# Patient Record
Sex: MALE | Race: WHITE | Employment: OTHER | URBAN - METROPOLITAN AREA
[De-identification: names, ages, dates, MRNs, and addresses within clinical notes are randomized per-mention and may not be internally consistent; named-entity substitution may affect disease eponyms.]

---

## 2020-08-20 ENCOUNTER — HOSPITAL ENCOUNTER (OUTPATIENT)
Dept: PREADMISSION TESTING | Age: 75
Discharge: HOME | End: 2020-08-20
Payer: MEDICARE

## 2020-08-20 LAB
ANION GAP SERPL CALC-SCNC: 4 MMOL/L (ref 3–18)
BUN SERPL-MCNC: 10 MG/DL (ref 7–18)
BUN/CREAT SERPL: 11 (ref 12–20)
CALCIUM SERPL-MCNC: 9.4 MG/DL (ref 8.5–10.1)
CHLORIDE SERPL-SCNC: 106 MMOL/L (ref 100–111)
CO2 SERPL-SCNC: 31 MMOL/L (ref 21–32)
CREAT SERPL-MCNC: 0.88 MG/DL (ref 0.6–1.3)
GLUCOSE SERPL-MCNC: 85 MG/DL (ref 74–99)
HCT VFR BLD AUTO: 43.3 % (ref 36–48)
HGB BLD-MCNC: 14.4 G/DL (ref 13–16)
POTASSIUM SERPL-SCNC: 3.9 MMOL/L (ref 3.5–5.5)
SODIUM SERPL-SCNC: 141 MMOL/L (ref 136–145)

## 2020-08-20 PROCEDURE — 80048 BASIC METABOLIC PNL TOTAL CA: CPT

## 2020-08-20 PROCEDURE — 36415 COLL VENOUS BLD VENIPUNCTURE: CPT

## 2020-08-20 PROCEDURE — 85018 HEMOGLOBIN: CPT

## 2020-08-26 ENCOUNTER — HOSPITAL ENCOUNTER (OUTPATIENT)
Dept: PREADMISSION TESTING | Age: 75
Discharge: HOME | End: 2020-08-26
Payer: MEDICARE

## 2020-08-26 PROCEDURE — 87635 SARS-COV-2 COVID-19 AMP PRB: CPT

## 2020-08-27 LAB — SARS-COV-2, COV2NT: NOT DETECTED

## 2020-09-02 ENCOUNTER — HOSPITAL ENCOUNTER (OUTPATIENT)
Age: 75
Setting detail: OUTPATIENT SURGERY
Discharge: HOME | End: 2020-09-02
Attending: SURGERY | Admitting: SURGERY
Payer: MEDICARE

## 2020-09-02 ENCOUNTER — ANESTHESIA (OUTPATIENT)
Dept: SURGERY | Age: 75
End: 2020-09-02
Payer: MEDICARE

## 2020-09-02 ENCOUNTER — ANESTHESIA EVENT (OUTPATIENT)
Dept: SURGERY | Age: 75
End: 2020-09-02
Payer: MEDICARE

## 2020-09-02 VITALS
BODY MASS INDEX: 26.22 KG/M2 | HEART RATE: 75 BPM | WEIGHT: 177 LBS | HEIGHT: 69 IN | TEMPERATURE: 97.6 F | DIASTOLIC BLOOD PRESSURE: 74 MMHG | RESPIRATION RATE: 16 BRPM | OXYGEN SATURATION: 99 % | SYSTOLIC BLOOD PRESSURE: 132 MMHG

## 2020-09-02 DIAGNOSIS — K40.30 INGUINAL HERNIA WITH OBSTRUCTION WITHOUT GANGRENE, RECURRENCE NOT SPECIFIED, UNSPECIFIED LATERALITY: Primary | ICD-10-CM

## 2020-09-02 PROCEDURE — 77030022704 HC SUT VLOC COVD -B: Performed by: SURGERY

## 2020-09-02 PROCEDURE — 77030031492 HC PRT ACC BLNT AIRSEAL CNMD -B: Performed by: SURGERY

## 2020-09-02 PROCEDURE — 77030002966 HC SUT PDS J&J -A: Performed by: SURGERY

## 2020-09-02 PROCEDURE — C1781 MESH (IMPLANTABLE): HCPCS | Performed by: SURGERY

## 2020-09-02 PROCEDURE — 77030002933 HC SUT MCRYL J&J -A: Performed by: SURGERY

## 2020-09-02 PROCEDURE — 74011250636 HC RX REV CODE- 250/636: Performed by: SURGERY

## 2020-09-02 PROCEDURE — 77030016151 HC PROTCTR LNS DFOG COVD -B: Performed by: SURGERY

## 2020-09-02 PROCEDURE — 77030040361 HC SLV COMPR DVT MDII -B: Performed by: SURGERY

## 2020-09-02 PROCEDURE — 76060000033 HC ANESTHESIA 1 TO 1.5 HR: Performed by: SURGERY

## 2020-09-02 PROCEDURE — 76010000934 HC OR TIME 1 TO 1.5HR INTENSV - TIER 2: Performed by: SURGERY

## 2020-09-02 PROCEDURE — 77030010507 HC ADH SKN DERMBND J&J -B: Performed by: SURGERY

## 2020-09-02 PROCEDURE — 74011250636 HC RX REV CODE- 250/636: Performed by: ANESTHESIOLOGY

## 2020-09-02 PROCEDURE — 77030033200 HC PRT CLSR CRTR THOMP COOP -C: Performed by: SURGERY

## 2020-09-02 PROCEDURE — 77030008606 HC TRCR ENDOSC KII AMR -B: Performed by: SURGERY

## 2020-09-02 PROCEDURE — 77030008683 HC TU ET CUF COVD -A: Performed by: ANESTHESIOLOGY

## 2020-09-02 PROCEDURE — 76210000017 HC OR PH I REC 1.5 TO 2 HR: Performed by: SURGERY

## 2020-09-02 PROCEDURE — 77030006643: Performed by: ANESTHESIOLOGY

## 2020-09-02 PROCEDURE — 74011000250 HC RX REV CODE- 250: Performed by: NURSE ANESTHETIST, CERTIFIED REGISTERED

## 2020-09-02 PROCEDURE — 74011250637 HC RX REV CODE- 250/637: Performed by: ANESTHESIOLOGY

## 2020-09-02 PROCEDURE — 77030003578 HC NDL INSUF VERES AMR -B: Performed by: SURGERY

## 2020-09-02 PROCEDURE — 74011000258 HC RX REV CODE- 258: Performed by: SURGERY

## 2020-09-02 PROCEDURE — 77030020782 HC GWN BAIR PAWS FLX 3M -B: Performed by: SURGERY

## 2020-09-02 PROCEDURE — 76210000021 HC REC RM PH II 0.5 TO 1 HR: Performed by: SURGERY

## 2020-09-02 PROCEDURE — 77030012770 HC TRCR OPT FX AMR -B: Performed by: SURGERY

## 2020-09-02 PROCEDURE — 74011000250 HC RX REV CODE- 250: Performed by: SURGERY

## 2020-09-02 PROCEDURE — 74011000250 HC RX REV CODE- 250: Performed by: ANESTHESIOLOGY

## 2020-09-02 PROCEDURE — 77030035277 HC OBTRTR BLDELSS DISP INTU -B: Performed by: SURGERY

## 2020-09-02 DEVICE — LAPAROSCOPIC SELF-FIXATING MESH POLYESTER WITH POLYLACTIC ACID GRIPS AND COLLAGEN FILM
Type: IMPLANTABLE DEVICE | Site: GROIN | Status: FUNCTIONAL
Brand: PROGRIP

## 2020-09-02 RX ORDER — INSULIN LISPRO 100 [IU]/ML
INJECTION, SOLUTION INTRAVENOUS; SUBCUTANEOUS ONCE
Status: DISCONTINUED | OUTPATIENT
Start: 2020-09-02 | End: 2020-09-02 | Stop reason: HOSPADM

## 2020-09-02 RX ORDER — FLUMAZENIL 0.1 MG/ML
0.2 INJECTION INTRAVENOUS
Status: DISCONTINUED | OUTPATIENT
Start: 2020-09-02 | End: 2020-09-02 | Stop reason: HOSPADM

## 2020-09-02 RX ORDER — SODIUM CHLORIDE, SODIUM LACTATE, POTASSIUM CHLORIDE, CALCIUM CHLORIDE 600; 310; 30; 20 MG/100ML; MG/100ML; MG/100ML; MG/100ML
1000 INJECTION, SOLUTION INTRAVENOUS CONTINUOUS
Status: DISCONTINUED | OUTPATIENT
Start: 2020-09-02 | End: 2020-09-02 | Stop reason: HOSPADM

## 2020-09-02 RX ORDER — ONDANSETRON 2 MG/ML
INJECTION INTRAMUSCULAR; INTRAVENOUS AS NEEDED
Status: DISCONTINUED | OUTPATIENT
Start: 2020-09-02 | End: 2020-09-02 | Stop reason: HOSPADM

## 2020-09-02 RX ORDER — MIDAZOLAM HYDROCHLORIDE 1 MG/ML
INJECTION, SOLUTION INTRAMUSCULAR; INTRAVENOUS AS NEEDED
Status: DISCONTINUED | OUTPATIENT
Start: 2020-09-02 | End: 2020-09-02 | Stop reason: HOSPADM

## 2020-09-02 RX ORDER — DEXTROSE MONOHYDRATE 100 MG/ML
125-250 INJECTION, SOLUTION INTRAVENOUS AS NEEDED
Status: DISCONTINUED | OUTPATIENT
Start: 2020-09-02 | End: 2020-09-02 | Stop reason: HOSPADM

## 2020-09-02 RX ORDER — FENTANYL CITRATE 50 UG/ML
INJECTION, SOLUTION INTRAMUSCULAR; INTRAVENOUS AS NEEDED
Status: DISCONTINUED | OUTPATIENT
Start: 2020-09-02 | End: 2020-09-02 | Stop reason: HOSPADM

## 2020-09-02 RX ORDER — MAGNESIUM SULFATE 100 %
4 CRYSTALS MISCELLANEOUS AS NEEDED
Status: DISCONTINUED | OUTPATIENT
Start: 2020-09-02 | End: 2020-09-02 | Stop reason: HOSPADM

## 2020-09-02 RX ORDER — ROCURONIUM BROMIDE 10 MG/ML
INJECTION, SOLUTION INTRAVENOUS AS NEEDED
Status: DISCONTINUED | OUTPATIENT
Start: 2020-09-02 | End: 2020-09-02 | Stop reason: HOSPADM

## 2020-09-02 RX ORDER — SODIUM CHLORIDE 0.9 % (FLUSH) 0.9 %
5-40 SYRINGE (ML) INJECTION EVERY 8 HOURS
Status: DISCONTINUED | OUTPATIENT
Start: 2020-09-02 | End: 2020-09-02 | Stop reason: HOSPADM

## 2020-09-02 RX ORDER — PROPOFOL 10 MG/ML
INJECTION, EMULSION INTRAVENOUS AS NEEDED
Status: DISCONTINUED | OUTPATIENT
Start: 2020-09-02 | End: 2020-09-02 | Stop reason: HOSPADM

## 2020-09-02 RX ORDER — FENTANYL CITRATE 50 UG/ML
25 INJECTION, SOLUTION INTRAMUSCULAR; INTRAVENOUS AS NEEDED
Status: DISCONTINUED | OUTPATIENT
Start: 2020-09-02 | End: 2020-09-02 | Stop reason: HOSPADM

## 2020-09-02 RX ORDER — DEXAMETHASONE SODIUM PHOSPHATE 4 MG/ML
INJECTION, SOLUTION INTRA-ARTICULAR; INTRALESIONAL; INTRAMUSCULAR; INTRAVENOUS; SOFT TISSUE AS NEEDED
Status: DISCONTINUED | OUTPATIENT
Start: 2020-09-02 | End: 2020-09-02 | Stop reason: HOSPADM

## 2020-09-02 RX ORDER — SODIUM CHLORIDE, SODIUM LACTATE, POTASSIUM CHLORIDE, CALCIUM CHLORIDE 600; 310; 30; 20 MG/100ML; MG/100ML; MG/100ML; MG/100ML
125 INJECTION, SOLUTION INTRAVENOUS CONTINUOUS
Status: DISCONTINUED | OUTPATIENT
Start: 2020-09-02 | End: 2020-09-02 | Stop reason: HOSPADM

## 2020-09-02 RX ORDER — EPHEDRINE SULFATE/0.9% NACL/PF 50 MG/5 ML
SYRINGE (ML) INTRAVENOUS AS NEEDED
Status: DISCONTINUED | OUTPATIENT
Start: 2020-09-02 | End: 2020-09-02 | Stop reason: HOSPADM

## 2020-09-02 RX ORDER — OXYCODONE AND ACETAMINOPHEN 5; 325 MG/1; MG/1
1 TABLET ORAL
Qty: 20 TAB | Refills: 0 | Status: SHIPPED | OUTPATIENT
Start: 2020-09-02 | End: 2020-09-07

## 2020-09-02 RX ORDER — HYDROMORPHONE HYDROCHLORIDE 1 MG/ML
0.5 INJECTION, SOLUTION INTRAMUSCULAR; INTRAVENOUS; SUBCUTANEOUS
Status: COMPLETED | OUTPATIENT
Start: 2020-09-02 | End: 2020-09-02

## 2020-09-02 RX ORDER — LIDOCAINE HYDROCHLORIDE 20 MG/ML
INJECTION, SOLUTION EPIDURAL; INFILTRATION; INTRACAUDAL; PERINEURAL AS NEEDED
Status: DISCONTINUED | OUTPATIENT
Start: 2020-09-02 | End: 2020-09-02 | Stop reason: HOSPADM

## 2020-09-02 RX ORDER — OXYCODONE HYDROCHLORIDE 5 MG/1
5 TABLET ORAL ONCE
Status: COMPLETED | OUTPATIENT
Start: 2020-09-02 | End: 2020-09-02

## 2020-09-02 RX ORDER — NALOXONE HYDROCHLORIDE 0.4 MG/ML
0.1 INJECTION, SOLUTION INTRAMUSCULAR; INTRAVENOUS; SUBCUTANEOUS AS NEEDED
Status: DISCONTINUED | OUTPATIENT
Start: 2020-09-02 | End: 2020-09-02 | Stop reason: HOSPADM

## 2020-09-02 RX ORDER — SODIUM CHLORIDE 0.9 % (FLUSH) 0.9 %
5-40 SYRINGE (ML) INJECTION AS NEEDED
Status: DISCONTINUED | OUTPATIENT
Start: 2020-09-02 | End: 2020-09-02 | Stop reason: HOSPADM

## 2020-09-02 RX ORDER — BUPIVACAINE HYDROCHLORIDE 2.5 MG/ML
INJECTION, SOLUTION EPIDURAL; INFILTRATION; INTRACAUDAL AS NEEDED
Status: DISCONTINUED | OUTPATIENT
Start: 2020-09-02 | End: 2020-09-02 | Stop reason: HOSPADM

## 2020-09-02 RX ORDER — KETAMINE HYDROCHLORIDE 10 MG/ML
INJECTION, SOLUTION INTRAMUSCULAR; INTRAVENOUS AS NEEDED
Status: DISCONTINUED | OUTPATIENT
Start: 2020-09-02 | End: 2020-09-02 | Stop reason: HOSPADM

## 2020-09-02 RX ADMIN — SUGAMMADEX 200 MG: 100 INJECTION, SOLUTION INTRAVENOUS at 12:32

## 2020-09-02 RX ADMIN — FENTANYL CITRATE 25 MCG: 50 INJECTION, SOLUTION INTRAMUSCULAR; INTRAVENOUS at 13:58

## 2020-09-02 RX ADMIN — HYDROMORPHONE HYDROCHLORIDE 0.5 MG: 1 INJECTION, SOLUTION INTRAMUSCULAR; INTRAVENOUS; SUBCUTANEOUS at 13:06

## 2020-09-02 RX ADMIN — LIDOCAINE HYDROCHLORIDE 80 MG: 20 INJECTION, SOLUTION EPIDURAL; INFILTRATION; INTRACAUDAL; PERINEURAL at 11:17

## 2020-09-02 RX ADMIN — ROCURONIUM BROMIDE 35 MG: 10 INJECTION, SOLUTION INTRAVENOUS at 11:18

## 2020-09-02 RX ADMIN — MIDAZOLAM 2 MG: 1 INJECTION INTRAMUSCULAR; INTRAVENOUS at 11:08

## 2020-09-02 RX ADMIN — SODIUM CHLORIDE, SODIUM LACTATE, POTASSIUM CHLORIDE, AND CALCIUM CHLORIDE: 600; 310; 30; 20 INJECTION, SOLUTION INTRAVENOUS at 11:34

## 2020-09-02 RX ADMIN — DEXAMETHASONE SODIUM PHOSPHATE 4 MG: 4 INJECTION, SOLUTION INTRAMUSCULAR; INTRAVENOUS at 11:28

## 2020-09-02 RX ADMIN — SODIUM CHLORIDE, SODIUM LACTATE, POTASSIUM CHLORIDE, AND CALCIUM CHLORIDE 1000 ML: 600; 310; 30; 20 INJECTION, SOLUTION INTRAVENOUS at 13:16

## 2020-09-02 RX ADMIN — KETAMINE HYDROCHLORIDE 20 MG: 10 INJECTION, SOLUTION INTRAMUSCULAR; INTRAVENOUS at 11:43

## 2020-09-02 RX ADMIN — CEFOXITIN SODIUM 2 G: 2 POWDER, FOR SOLUTION INTRAVENOUS at 11:23

## 2020-09-02 RX ADMIN — HYDROMORPHONE HYDROCHLORIDE 0.5 MG: 1 INJECTION, SOLUTION INTRAMUSCULAR; INTRAVENOUS; SUBCUTANEOUS at 13:29

## 2020-09-02 RX ADMIN — FENTANYL CITRATE 100 MCG: 50 INJECTION, SOLUTION INTRAMUSCULAR; INTRAVENOUS at 11:17

## 2020-09-02 RX ADMIN — SODIUM CHLORIDE, SODIUM LACTATE, POTASSIUM CHLORIDE, AND CALCIUM CHLORIDE 125 ML/HR: 600; 310; 30; 20 INJECTION, SOLUTION INTRAVENOUS at 08:32

## 2020-09-02 RX ADMIN — Medication 10 MG: at 11:21

## 2020-09-02 RX ADMIN — PROPOFOL 200 MG: 10 INJECTION, EMULSION INTRAVENOUS at 11:17

## 2020-09-02 RX ADMIN — OXYCODONE 5 MG: 5 TABLET ORAL at 15:20

## 2020-09-02 RX ADMIN — KETAMINE HYDROCHLORIDE 10 MG: 10 INJECTION, SOLUTION INTRAMUSCULAR; INTRAVENOUS at 12:03

## 2020-09-02 RX ADMIN — ONDANSETRON HYDROCHLORIDE 4 MG: 2 INJECTION INTRAMUSCULAR; INTRAVENOUS at 11:33

## 2020-09-02 RX ADMIN — ROCURONIUM BROMIDE 5 MG: 10 INJECTION, SOLUTION INTRAVENOUS at 11:57

## 2020-09-02 RX ADMIN — HYDROMORPHONE HYDROCHLORIDE 0.5 MG: 1 INJECTION, SOLUTION INTRAMUSCULAR; INTRAVENOUS; SUBCUTANEOUS at 13:41

## 2020-09-02 RX ADMIN — ROCURONIUM BROMIDE 15 MG: 10 INJECTION, SOLUTION INTRAVENOUS at 11:41

## 2020-09-02 RX ADMIN — HYDROMORPHONE HYDROCHLORIDE 0.5 MG: 1 INJECTION, SOLUTION INTRAMUSCULAR; INTRAVENOUS; SUBCUTANEOUS at 13:19

## 2020-09-02 NOTE — INTERVAL H&P NOTE
Update History & Physical 
 
The Patient's History and Physical of September 2,  
 was reviewed with the patient and I examined the patient. There was no change. The surgical site was confirmed by the patient and me. Plan:  The risk, benefits, expected outcome, and alternative to the recommended procedure have been discussed with the patient. Patient understands and wants to proceed with the procedure.  
 
Electronically signed by Tj Harding MD on 9/2/2020 at 8:43 AM

## 2020-09-02 NOTE — BRIEF OP NOTE
Brief Postoperative Note    Patient: Jobie Gaucher  YOB: 1945  MRN: [id number]    Date of Procedure: 9/2/2020     Pre-Op Diagnosis: LEFT INGUINAL HERNIA    Post-Op Diagnosis: Same as preoperative diagnosis. Procedure(s):  ROBOTIC REPAIR LEFT INGUINAL HERNIA, NEURECTOMY INGUINAL NERVES    Surgeon(s): David Multani MD    Surgical Assistant: None    Anesthesia: General     Estimated Blood Loss (mL): Minimal    Complications: None    Specimens: * No specimens in log *     Implants:   Implant Name Type Inv.  Item Serial No.  Lot No. LRB No. Used Action   MESH ABSORB SURG PROGRIP [id number] -- PROGRIP - [id number]  [address] [place], [place] [id number] Left 1 Implanted       Drains: * No LDAs found *    Findings: H    Electronically Signed by Lorrie Lord MD on 9/2/2020 at 12:20 PM

## 2020-09-02 NOTE — PERIOP NOTES
TRANSFER - IN REPORT:    Verbal report received from [facility], BRYAN and Erendira Espinal RN on Jeffrey Mccartney  being received from OR(unit) for routine progression of care      Report consisted of patients Situation, Background, Assessment and   Recommendations(SBAR). Information from the following report(s) OR Summary, Procedure Summary, Intake/Output and MAR was reviewed with the receiving nurse. Opportunity for questions and clarification was provided. Assessment completed upon patients arrival to unit and care assumed.

## 2020-09-02 NOTE — DISCHARGE INSTRUCTIONS
Post-Operative Discharge Instructions  Araceli Burgos. Katy Andersen M.D.  [address] [place] [address], Sharri Arriaga  [phone]    Patient: Bette Caballero MRN: [id number]  [facility]: [id number]    YOB: 1945  Age: 70 y.o. Sex: male    DOA: 2020 LOS:  LOS: 0 days   Discharge Date:      Acute Diagnoses:  LEFT INGUINAL HERNIA    Chronic Medical Diagnoses:      Diet  1. Resume prior to surgery diet as tolerated. Activity  1. Do not drive a car or operate any hazardous machinery the day of surgery. 2. Rest quietly today. 3. No bending or heavy lifting. 4. You may resume other prior to surgery activities as tolerated. 5. You may remove the bandage and shower in 1 day. Drain / Wound Care  1. Follow all drain / wound care instructions exactly as explained by the Nurse at time of discharge. 2. Apply an ice pack to the surgical site for 48 hours. 3. Do not put any salves or ointments on the wound. Allow it to form a dry scab. 4. Leave steri-strips / Dermabond alone. They should be allowed to fall off on their own in 7-14 days. Medications  1. It is important to take your medications exactly as they are prescribed. 2. Keep your medication in the bottles provided by the pharmacist, and keep a list of the medication names, dosages, and times they should be taken in your wallet. Call 911 anytime you think you may need emergency care. For example, call if:  · You passed out (lost consciousness). · You have severe trouble breathing. · You have sudden chest pain and shortness of breath. Notify your Surgeon for any of the followin. Fever, chills, nausea, vomiting, severe abdominal pain or bleeding. 2. If you experience any redness or discharge or sign of infection. 3. Persistent nausea lasting more than 24 hours. If you are unable to reach your Surgeon for any of the symptoms above, you should proceed directly to the nearest Emergency Department.     Post-Operative Appointment Information    Call Dr. Halina Camp office tomorrow morning at [phone] to schedule a post-operative office visit in one (1) week. If any questions or concerns arise, call your Surgeon at [phone]. DISCHARGE SUMMARY from Nurse    PATIENT INSTRUCTIONS:    After general anesthesia or intravenous sedation, for 24 hours or while taking prescription Narcotics:  · Limit your activities  · Do not drive and operate hazardous machinery  · Do not make important personal or business decisions  · Do  not drink alcoholic beverages  · If you have not urinated within 8 hours after discharge, please contact your surgeon on call. Report the following to your surgeon:  · Excessive pain, swelling, redness or odor of or around the surgical area  · Temperature over 100.5  · Nausea and vomiting lasting longer than 4 hours or if unable to take medications  · Any signs of decreased circulation or nerve impairment to extremity: change in color, persistent  numbness, tingling, coldness or increase pain  · Any questions    What to do at Home:  Recommended activity: Activity as tolerated and no driving for today,     If you experience any of the following symptoms as above, please follow up with Dr Marie Martinez. *  Please give a list of your current medications to your Primary Care Provider. *  Please update this list whenever your medications are discontinued, doses are      changed, or new medications (including over-the-counter products) are added. *  Please carry medication information at all times in case of emergency situations. These are general instructions for a healthy lifestyle:    No smoking/ No tobacco products/ Avoid exposure to second hand smoke  Surgeon General's Warning:  Quitting smoking now greatly reduces serious risk to your health.     Obesity, smoking, and sedentary lifestyle greatly increases your risk for illness    A healthy diet, regular physical exercise & weight monitoring are important for maintaining a healthy lifestyle    You may be retaining fluid if you have a history of heart failure or if you experience any of the following symptoms:  Weight gain of 3 pounds or more overnight or 5 pounds in a week, increased swelling in our hands or feet or shortness of breath while lying flat in bed. Please call your doctor as soon as you notice any of these symptoms; do not wait until your next office visit. Patient Education        9 Things To Do If You've Been Exposed to COVID-19    Stay home. If you've been exposed, you should stay in isolation for 14 days. Don't go to school, work, or public areas. And don't use public transportation, ride-shares, or taxis unless you have no choice. Leave your home only if you need to get medical care. But call the doctor's office first so they know you're coming, and wear a cloth face cover when you go. Call your doctor. Call your doctor or other health professional to let them know that you've been exposed. They might want you to be tested, or they may have other instructions for you. If you become sick, wear a face cover when you are around other people. It can help stop the spread of the virus when you cough or sneeze. Limit contact with people in your home. If possible, stay in a separate bedroom and use a separate bathroom. Avoid contact with pets and other animals. Cover your mouth and nose with a tissue when you cough or sneeze. Then throw it in the trash right away. Wash your hands often, especially after you cough or sneeze. Use soap and water, and scrub for at least 20 seconds. If soap and water aren't available, use an alcohol-based hand . Don't share personal household items. These include bedding, towels, cups and glasses, and eating utensils. Clean and disinfect your home every day. Use household  or disinfectant wipes or sprays.  Take special care to clean things that you grab with your hands. These include doorknobs, remote controls, phones, and handles on your refrigerator and microwave. And don't forget countertops, tabletops, bathrooms, and computer keyboards. Current as of: May 8, 2020               Content Version: 12.5  © 2006-2020 [employer]. Care instructions adapted under license by [employer] (which disclaims liability or warranty for this information). If you have questions about a medical condition or this instruction, always ask your healthcare professional. [place] [address] any warranty or liability for your use of this information. Patient armband removed and shredded  The discharge information has been reviewed with the patient and spouse. The patient and spouse verbalized understanding. Discharge medications reviewed with the patient and spouse and appropriate educational materials and side effects teaching were provided.   ___________________________________________________________________________________________________________________________________

## 2020-09-02 NOTE — ANESTHESIA PREPROCEDURE EVALUATION
Relevant Problems   No relevant active problems       Anesthetic History   No history of anesthetic complications            Review of Systems / Medical History  Patient summary reviewed, nursing notes reviewed and pertinent labs reviewed    Pulmonary  Within defined limits          Asthma        Neuro/Psych   Within defined limits           Cardiovascular                Pertinent negatives: No hypertension       GI/Hepatic/Renal  Within defined limits              Endo/Other  Within defined limits           Other Findings              Physical Exam    Airway  Mallampati: II  TM Distance: 4 - 6 cm  Neck ROM: normal range of motion   Mouth opening: Normal     Cardiovascular               Dental  No notable dental hx       Pulmonary                 Abdominal  GI exam deferred       Other Findings            Anesthetic Plan    ASA: 2  Anesthesia type: general          Induction: Intravenous  Anesthetic plan and risks discussed with: Patient

## 2020-09-02 NOTE — ANESTHESIA POSTPROCEDURE EVALUATION
Procedure(s):  ROBOTIC REPAIR LEFT INGUINAL HERNIA, NEURECTOMY INGUINAL NERVES. general    Anesthesia Post Evaluation        Comments: Post-Anesthesia Evaluation & Assessment    Patient hemodynamically stable    No untreated/active PONV    Post-operative hydration adequate. Adequate post-operative analgesia per PACU discharge criteria    Mental status & level of consciousness: alert and oriented x 3    Respiratory status: patent unassisted airway     No apparent anesthetic complications requiring additional post-anesthetic care    Patient has met all discharge requirements. Nicolas Arellano MD          INITIAL Post-op Vital signs:   Vitals Value Taken Time   /88 9/2/2020  2:15 PM   Temp 36.4 °C (97.5 °F) 9/2/2020 12:46 PM   Pulse 68 9/2/2020  2:22 PM   Resp 16 9/2/2020  2:22 PM   SpO2 95 % 9/2/2020  2:22 PM   Vitals shown include unvalidated device data.

## 2020-09-02 NOTE — H&P
Assessment/Plan  # Detail Type Description    1. Assessment Inguinal hernia (K40.90). Patient Plan Discussed DX and options. Rec robotic repair. I have discussed the risks benefits and alternatives of the procedure to the patient including bleeding, infection, use of mesh, myofascial release, chronic post op pain, reason and side effects of nerve resections, recurrence and loss of testicle. They understand and wish to proceed. 2. 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 3. Assessment Cancer of prostate (C61). 4. Assessment Essential (primary) hypertension (I10). 5. Assessment Body mass index (BMI) 27.0-27.9, adult (D55.06). Plan Orders Today's instructions / counseling include(s) Dietary management education, guidance, and counseling. This 70year old male presents for Hernia. History of Present Illness:  1. Hernia   Severity: moderate. The problem is worse. It occurs occasionally. and LLQ  There is radiation to groin. The patient describes it as dull. Context includes physical activity. Identified risk factors include overweight. Symptom is aggravated by physical activity. Relieving factors include ice and sitting down. Pertinent negatives include abdominal distention, anorexia, back pain, bloating, blood in stool, constipation, cough, diaphoresis, diarrhea, dizziness, dyspnea, epigastric pain, eructation, fatigue, fever, flank pain, flatulence, heartburn, hematuria, jaundice, lightheadedness, menstruation, milk/dairy intolerance, myalgia, nausea, post prandial fullness, reflux, scrotal swelling, vomiting, weight gain and weight loss. Additional information: 70year old male who was referred by Dr. Baldomero May is being seen today for a LIH. Hernia with pain now. Hx prostate CA with obs. Jelena Oswald PROBLEM LIST:   Problem List reviewed.    Problem Description Onset Date Chronic Clinical Status Notes   Cancer of prostate 06/05/2018 Y Asthma 10/28/2010 Y     Varicose veins of lower extremity 10/28/2010 Y     Allergic rhinitis 10/28/2010 Y     Benign essential hypertension 10/28/2010 Y     Hyperlipidemia 10/28/2010 Y           PAST MEDICAL/SURGICAL HISTORY  (Detailed)    Disease/disorder Onset Date Management Date Comments     MRI-US fusion prostate biopsy (all codes - same as MM from ?) 10/16/2019      Shoulder Surgery 2006    Leg Fracture         Family History  (Detailed)  Relationship Family Member Name  Age at Death Condition Onset Age Cause of Death   Family h/o    Cancer - prostate     Siblings    Diabetes mellitus  N   Siblings    Cancer, prostate  N       Social History:  (Detailed)  Tobacco use reviewed. The patient is right-handed. Preferred language is [place]. The patient does not need an . EDUCATION/EMPLOYMENT/OCCUPATION  Employment History Status Retired Restrictions     full-time       MARITAL STATUS/FAMILY/SOCIAL SUPPORT  Marital status:    CHILDREN  Has children:  Tobacco use status: Ex-cigarette smoker. Smoking status: Former smoker. TOBACCO SCREENING:  Patient has used tobacco. Patient has not used tobacco in the last 30 days. SMOKING STATUS  Type Smoking Status Usage Per Day Years Used Pack Years Total Pack Years   Cigarette Former smoker 2 Packs 10.00 20.00 20.00     VAPING USE  Screened for vaping? Yes  Status: Not a current user    TOBACCO/VAPING EXPOSURE  There is passive smoke exposure. The patient has not had exposure to second hand smoke in the home environment. Other exposure locations include: Public. Tobacco type: Cigarette  Level of exposure: Mild  ALCOHOL  There is a history of alcohol use. Type: Beer. 3-4 beers consumed daily. Last alcoholic drink was last night. CAFFEINE  The patient uses caffeine: coffee - 2 cups a day. LIFESTYLE  Moderate activity level. Exercises daily. DIET  high calorie.         Medications (active prior to today)  Medication Name Sig Description Start Date Stop Date Refilled Rx Elsewhere   FLUTICASONE PROP 50 MCG SPRAY instill 2 sprays into each nostril once daily 12/11/2014 12/11/2014 N   albuterol sulfate 2.5 mg/3 mL (0.083 %) solution for nebulization inhale contents of 1 vial in nebulizer every 4 hours if needed for cough or wheezing 03/31/2016 03/31/2016 N   clobetasol 0.05 % topical cream apply by topical route 2 times every day a thin layer to the affected area(s) 04/06/2020   N     Patient Status   Completed with information received for patient transitioning into care. Medication Reconciliation  Medications reconciled today. Medication Reviewed  Adherence Medication Name Sig Desc Elsewhere Status   taking as directed FLUTICASONE PROP 50 MCG SPRAY instill 2 sprays into each nostril once daily N Verified   taking as directed albuterol sulfate 2.5 mg/3 mL (0.083 %) solution for nebulization inhale contents of 1 vial in nebulizer every 4 hours if needed for cough or wheezing N Verified   taking as directed clobetasol 0.05 % topical cream apply by topical route 2 times every day a thin layer to the affected area(s) N Verified     Allergies:  Ingredient Reaction (Severity) Medication Name Comment   AMLODIPINE BESYLATE Myalgias, Edema  Caduet   ATORVASTATIN CALCIUM Myalgias, Edema  Caduet   ATORVASTATIN CALCIUM Myalgias  Lipitor   Reviewed, no changes. Review of Systems  System Neg/Pos Details   Constitutional Negative Fatigue, Fever, Night sweats, Weight gain and Weight loss. ENMT Negative Hearing loss, Tinnitus, Vertigo and Voice change. Eyes Negative Diplopia and Vision loss. Respiratory Negative Asthma, Cough, Dyspnea, Hemoptysis, Known TB exposure and Wheezing. Cardio Negative Chest pain, Claudication, Edema, Irregular heartbeat/palpitations and Thrombophlebitis.    GI Negative Abdominal distention, Anorexia, Bloating, Blood in stool, Constipation, Diarrhea, Dysphagia, Epigastric pain, Eructation, Flatulence, Heartburn, Hemorrhoids, Jaundice, Milk/diary intolerance, Nausea, Post prandial fullness, Reflux and Vomiting.  Negative Back pain, Dysuria, Flank pain, Hematuria, Menstruation, Nocturia, Passage stone/gravel, Scrotal swelling and Urinary incontinence. Endocrine Negative Cold intolerance, Diaphoresis and Goiter. Neuro Negative Dizziness, Focal weakness, Headache, Lightheadedness, Paresthesia, Seizures and Syncope. Integumentary Negative Change in shape/size of mole(s) and Skin lesion. MS Negative Back pain, Bone/joint symptoms, Muscle weakness and Myalgia. Hema/Lymph Negative Easy bleeding and Easy bruising. Allergic/Immuno Negative Contact allergy and Contact dermatitis. Vital Signs   Height  Time ft in cm Last Measured Height Position   1:12 PM 5.0 8.50 173.99 06/23/2020 Standing   Weight/BSA/BMI  Time lb oz kg Context BMI kg/m2 BSA m2   1:12 .00  83.915 dressed with shoes 27.72    Blood Pressure  Time BP mm/Hg Position Side Site Method Cuff Size   1:12 /84 sitting right arm manual adult   Temperature/Pulse/Respiration  Time Temp F Temp C Temp Site Pulse/min Pattern Resp/ min   1:12 PM 97.80 36.56 oral 66 regular 12   Pulse Oximetry/FIO2  Time Pulse Ox (Rest %) Pulse Ox (Amb %) O2 Sat O2 L/Min Timing FiO2 % L/min Delivery Method Finger Probe   1:12 PM 97  RA      L Index   Pain Scale  Time Pain Score Method   1:12 PM 2/10 Numeric Pain Intensity Scale   Measured By  Time Measured by   1:12 PM Esthela Night     Physical Exam:  Exam Findings Details   Constitutional Normal Well developed.    Eyes Normal Conjunctiva - Right: Normal, Left: Normal. Pupil - Right: Normal, Left: Normal.   Ears Normal Inspection - Right: Normal, Left: Normal. Hearing - Right: Normal, Left: Normal.   Nose/Mouth/Throat Normal External nose - Normal. Lips/teeth/gums - Normal. Oropharynx - Normal.   Neck Exam Normal Inspection - Normal. Palpation - Normal. Thyroid gland - Normal.   Lymph Detail Normal No cervical, supraclavicular, or axillary adenopathy. Respiratory Normal Inspection - Normal. Auscultation - Normal. Effort - Normal.   Cardiovascular Normal Regular rate and rhythm. No murmurs, gallops, or rubs. Vascular Normal Pulses - Dorsalis pedis: Normal. Capillary refill - Less than 2 seconds. Abdomen Normal Inspection - Normal. No abdominal tenderness. No hepatic enlargement. No spleen enlargement. No hernia. Genitourinary * Hernia - Hernia Type: inguinal. Location: left, Reducible. Genitourinary Normal Scrotum - Normal. Testes - Normal.   Skin Normal Inspection - Normal.   Musculoskeletal Normal Visual overview of all four extremities is normal.   Extremity Normal No edema. Neurological Normal Cranial nerves - Cranial nerves I grossly intact. Psychiatric Normal Orientation - Oriented to time, place, person & situation. Appropriate mood and affect. Normal insight. Normal judgment.          Immunizations Entered by History    Date Immunization   10/7/2019 12:00:00 AM Influenza, seasonal, injectable   5/20/2010 12:00:00 AM Tdap       Medications (added, continued, or stopped this visit):  Start Date Medication Directions PRN Status PRN Reason Instruction Stop Date   03/31/2016 albuterol sulfate 2.5 mg/3 mL (0.083 %) solution for nebulization inhale contents of 1 vial in nebulizer every 4 hours if needed for cough or wheezing N      04/06/2020 clobetasol 0.05 % topical cream apply by topical route 2 times every day a thin layer to the affected area(s) N      12/11/2014 FLUTICASONE PROP 50 MCG SPRAY instill 2 sprays into each nostril once daily N            Active Patient Care Team Members    Name Contact Agency Type Support Role Relationship Active Date Inactive Date [address]   specialist    Cardiology   Vickie Felipe   Patient provider PCP   [id number]   specialist    Orthopedics   Zain Aggarwal   encounter provider    Urology   Billy Valenzuela Elvira Tsang   specialist    endocrinology   [facility]   specialist    [facility]   Hitesh Bernard   specialist    diagnostic radiology

## 2020-09-02 NOTE — INTERVAL H&P NOTE
Update History & Physical 
 
The Patient's History and Physical of September 2,  
 was reviewed with the patient and I examined the patient. There was no change. The surgical site was confirmed by the patient and me. Plan:  The risk, benefits, expected outcome, and alternative to the recommended procedure have been discussed with the patient. Patient understands and wants to proceed with the procedure.  
 
Electronically signed by Joyce Parisi MD on 9/2/2020 at 10:36 AM

## 2020-09-02 NOTE — PERIOP NOTES
Reviewed PTA medication list with patient/caregiver and patient/caregiver denies any additional medications. Patient admits to having a responsible adult care for them for at least 24 hours after surgery.     Dual skin assessment completed by Diana Warren RN and Liyah Roman RN.

## 2020-09-03 NOTE — OP NOTES
[facility]  OPERATIVE REPORT    Name:  Krista Abreu  MR#:   [id number]  :  1945  ACCOUNT #:  [de-identified]  DATE OF SERVICE:  2020    PREOPERATIVE DIAGNOSIS:  Left inguinal hernia. POSTOPERATIVE DIAGNOSES:  1. Left inguinal hernia. 2.  Neuroma, neuralgia. PROCEDURE PERFORMED:  1.  Robotic repair of left inguinal hernia. 2.  Neurectomy. SURGEON:  Paddy Brar MD    ASSISTANT:  None. ANESTHESIA:  General endotracheal.    COMPLICATIONS:  None. SPECIMENS REMOVED:  None. IMPLANTS:  None. ESTIMATED BLOOD LOSS:  Minimal.    INDICATIONS:  A 82-year-old gentleman who presents with a symptomatic left inguinal hernia. He will undergo repair. I discussed the risks, benefits, and alternatives to him including the risk of bleeding, infection, reoperation, recurrence, use of mesh, and possible neurectomy for chronic pain. He understands and wishes to proceed. PROCEDURE:  He was placed in the lithotomy position. His abdomen was prepped and draped in the usual fashion. A Veress needle was inserted into the left upper quadrant using one-drop technique and pneumoperitoneum was established. A 5 mm Optiview trocar was placed in the supraumbilical position and dilated to a 12 mm cannula port. An 8 mm AirSeal port was placed in the epigastrium under visualization. This was the working port. Two 8 mm robotic ports were placed, one in the right upper quadrant, one in the left upper quadrant under visualization. The abdomen was inspected and the patient was found to have a large left inguinal hernia containing the sigmoid colon. Colon was reduced and the peritoneal flap was taken down on the left side using the monopolar scissors. Once an adequate flap was established, all critical views and spaces were identified and confirmed. The patient was found to have a large indirect inguinal hernia.   The hernia sac and any lipomatous tissue were reduced and dissected away from the cord structures as far inferior as possible to allow adequate placement of mesh. A laparoscopic ProGrip mesh was introduced, placed over the inguinal floor, and allowed to soft . There was good overlay and overlap of the mesh. There was good hemostasis. The peritoneal flap was closed with a 2-0 PDS V-Loc continuous suture. All ports were removed. The fascial incision of the 12 mm port was closed with a 0 PDS interrupted suture. Skin incisions were closed with 4-0 Monocryl subcuticular closure and Dermabond. The patient tolerated the procedure well.       Tj Grijalva MD      [id number]/  D:  09/02/2020 18:46  T:  09/03/2020 5:08  JOB #:  [id number]
